# Patient Record
Sex: MALE | Race: BLACK OR AFRICAN AMERICAN | Employment: FULL TIME | URBAN - METROPOLITAN AREA
[De-identification: names, ages, dates, MRNs, and addresses within clinical notes are randomized per-mention and may not be internally consistent; named-entity substitution may affect disease eponyms.]

---

## 2023-03-20 ENCOUNTER — HOSPITAL ENCOUNTER (OUTPATIENT)
Age: 63
Setting detail: OUTPATIENT SURGERY
End: 2023-03-20
Attending: PODIATRIST
Payer: COMMERCIAL

## 2023-03-30 RX ORDER — OMEPRAZOLE 40 MG/1
40 CAPSULE, DELAYED RELEASE ORAL DAILY
COMMUNITY

## 2023-04-05 RX ORDER — SODIUM CHLORIDE, SODIUM LACTATE, POTASSIUM CHLORIDE, CALCIUM CHLORIDE 600; 310; 30; 20 MG/100ML; MG/100ML; MG/100ML; MG/100ML
1000 INJECTION, SOLUTION INTRAVENOUS CONTINUOUS
Start: 2023-04-05

## 2023-04-06 ENCOUNTER — ANESTHESIA EVENT (OUTPATIENT)
Dept: SURGERY | Age: 63
End: 2023-04-06
Payer: COMMERCIAL

## 2023-04-06 ENCOUNTER — ANESTHESIA (OUTPATIENT)
Dept: SURGERY | Age: 63
End: 2023-04-06
Payer: COMMERCIAL

## 2023-04-06 PROCEDURE — 74011250636 HC RX REV CODE- 250/636: Performed by: NURSE ANESTHETIST, CERTIFIED REGISTERED

## 2023-04-06 PROCEDURE — 74011000250 HC RX REV CODE- 250: Performed by: NURSE ANESTHETIST, CERTIFIED REGISTERED

## 2023-04-06 PROCEDURE — 74011250636 HC RX REV CODE- 250/636: Performed by: PODIATRIST

## 2023-04-06 PROCEDURE — 74011000250 HC RX REV CODE- 250: Performed by: PODIATRIST

## 2023-04-06 DEVICE — SCREW BNE L18MM DIA2.5MM MIC FT ANK TI SELF DRL ST CANN: Type: IMPLANTABLE DEVICE | Site: FOOT | Status: FUNCTIONAL

## 2023-04-06 DEVICE — SCREW BNE L13MM DIA2MM CORT FT ANK TI SELF DRL ST SLD FULL: Type: IMPLANTABLE DEVICE | Site: FOOT | Status: FUNCTIONAL

## 2023-04-06 RX ORDER — MIDAZOLAM HYDROCHLORIDE 1 MG/ML
INJECTION, SOLUTION INTRAMUSCULAR; INTRAVENOUS AS NEEDED
Status: DISCONTINUED | OUTPATIENT
Start: 2023-04-06 | End: 2023-04-06 | Stop reason: HOSPADM

## 2023-04-06 RX ORDER — SODIUM CHLORIDE, SODIUM LACTATE, POTASSIUM CHLORIDE, CALCIUM CHLORIDE 600; 310; 30; 20 MG/100ML; MG/100ML; MG/100ML; MG/100ML
INJECTION, SOLUTION INTRAVENOUS
Status: DISCONTINUED | OUTPATIENT
Start: 2023-04-06 | End: 2023-04-06 | Stop reason: HOSPADM

## 2023-04-06 RX ORDER — FENTANYL CITRATE 50 UG/ML
INJECTION, SOLUTION INTRAMUSCULAR; INTRAVENOUS AS NEEDED
Status: DISCONTINUED | OUTPATIENT
Start: 2023-04-06 | End: 2023-04-06 | Stop reason: HOSPADM

## 2023-04-06 RX ORDER — LIDOCAINE HYDROCHLORIDE 10 MG/ML
INJECTION, SOLUTION EPIDURAL; INFILTRATION; INTRACAUDAL; PERINEURAL AS NEEDED
Status: DISCONTINUED | OUTPATIENT
Start: 2023-04-06 | End: 2023-04-06 | Stop reason: HOSPADM

## 2023-04-06 RX ORDER — PROPOFOL 10 MG/ML
INJECTION, EMULSION INTRAVENOUS
Status: DISCONTINUED | OUTPATIENT
Start: 2023-04-06 | End: 2023-04-06 | Stop reason: HOSPADM

## 2023-04-06 RX ORDER — PROPOFOL 10 MG/ML
INJECTION, EMULSION INTRAVENOUS AS NEEDED
Status: DISCONTINUED | OUTPATIENT
Start: 2023-04-06 | End: 2023-04-06 | Stop reason: HOSPADM

## 2023-04-06 RX ADMIN — SODIUM CHLORIDE, POTASSIUM CHLORIDE, SODIUM LACTATE AND CALCIUM CHLORIDE 1000 ML: 600; 310; 30; 20 INJECTION, SOLUTION INTRAVENOUS at 07:19

## 2023-04-06 RX ADMIN — SODIUM CHLORIDE, POTASSIUM CHLORIDE, SODIUM LACTATE AND CALCIUM CHLORIDE: 600; 310; 30; 20 INJECTION, SOLUTION INTRAVENOUS at 07:57

## 2023-04-06 RX ADMIN — PROPOFOL 50 MCG/KG/MIN: 10 INJECTION, EMULSION INTRAVENOUS at 08:07

## 2023-04-06 RX ADMIN — FENTANYL CITRATE 25 MCG: 0.05 INJECTION, SOLUTION INTRAMUSCULAR; INTRAVENOUS at 07:57

## 2023-04-06 RX ADMIN — FENTANYL CITRATE 25 MCG: 0.05 INJECTION, SOLUTION INTRAMUSCULAR; INTRAVENOUS at 08:05

## 2023-04-06 RX ADMIN — MIDAZOLAM HYDROCHLORIDE 2 MG: 2 INJECTION, SOLUTION INTRAMUSCULAR; INTRAVENOUS at 07:57

## 2023-04-06 RX ADMIN — PROPOFOL 50 MG: 10 INJECTION, EMULSION INTRAVENOUS at 08:06

## 2023-04-06 RX ADMIN — EPHEDRINE SULFATE 5 MG: 50 INJECTION INTRAVENOUS at 08:45

## 2023-04-06 RX ADMIN — EPHEDRINE SULFATE 5 MG: 50 INJECTION INTRAVENOUS at 08:59

## 2023-04-06 RX ADMIN — LIDOCAINE HYDROCHLORIDE 100 MG: 10 INJECTION, SOLUTION EPIDURAL; INFILTRATION; INTRACAUDAL; PERINEURAL at 08:05

## 2023-04-06 NOTE — H&P
Patient: Tim Yun MRN: [id number]  SSN: [id number]    YOB: 1960  Age: 70 y.o. Sex: male      History and Physical    Manuel Le Reason is a 70 y.o. male having Procedure(s):  CHEVRON/POSSIBLE AKIN OSTEOTOMY OF THE RIGHT FIRST METATARSAL PHALANGEAL JOINT, WEIL OSTEOTOMY OF THE RIGHT THIRD METATARSAL, ARTHROPLASTY OF THE RIGHT SECOND PROXIMAL INTERPHALANGEAL JOINT (MAC/LOCAL). Allergies: No Known Allergies     Chief Complaint: HERE FOR CHEVRON/POSSIBLE AKIN OSTEOTOMY OF THE RIGHT FIRST METATARSAL PHALANGEAL JOINT, WEIL OSTEOTOMY OF THE RIGHT THIRD METATARSAL, ARTHROPLASTY OF THE RIGHT SECOND PROXIMAL INTERPHALANGEAL JOINT. History of Present Illness: RIGHT FOOT PAIN. Past Medical History:   Diagnosis Date    Cancer [facility]) 2000    prostate cancer    Cancer ([facility] [address] 2000    renal cancer      Past Surgical History:   Procedure Laterality Date    HX OTHER SURGICAL      lasar surgery to remove renal CA    HX PROSTATECTOMY        Family History   Problem Relation Age of Onset    Diabetes Mother     Diabetes Sister     Cancer Sister         breast    Cancer Brother         prostate      Social History     Tobacco Use    Smoking status: Former     Packs/day: 0.25     Years: 12.00     Pack years: 3.00     Types: Cigarettes    Smokeless tobacco: Never   Substance Use Topics    Alcohol use: No        Prior to Admission medications    Medication Sig Start Date End Date Taking? Authorizing Provider   omeprazole (PRILOSEC) 40 mg capsule Take 1 Capsule by mouth daily. Not are of mg   Yes Provider, Historical   docusate sodium (COLACE) 100 mg capsule Take 1 Cap by mouth two (2) times a day. Patient not taking: Reported on 3/30/2023 7/25/12   Laly Warner MD   oxybutynin (DITROPAN) 5 mg tablet Take 1 Tab by mouth every eight (8) hours as needed for Other (Bladder Spasm).   Patient not taking: Reported on 3/30/2023 7/25/12   Laly Warner MD   oxyCODONE (ROXICODONE) 5 mg immediate release tablet Take 1 Tab by mouth every four (4) hours as needed for Pain. Patient not taking: Reported on 3/30/2023 7/25/12   Fredi Horn MD   ciprofloxacin (CIPRO) 500 mg tablet Take 500 mg by mouth two (2) times a day. Patient not taking: Reported on 3/30/2023    Provider, Historical        Visit Vitals  BP (!) 176/99 (BP 1 Location: Left upper arm, BP Patient Position: At rest;Semi fowlers)   Pulse 67   Temp 36.8 °C (98.2 °F)   Resp 18   Ht 6' (1.829 m)   Wt 102.1 kg (225 lb)   SpO2 100%   BMI 30.52 kg/m²        Assessment and Plan:   Frank Morgan is a 70 y.o. male having Procedure(s):  CHEVRON/POSSIBLE AKIN OSTEOTOMY OF THE RIGHT FIRST METATARSAL PHALANGEAL JOINT, WEIL OSTEOTOMY OF THE RIGHT THIRD METATARSAL, ARTHROPLASTY OF THE RIGHT SECOND PROXIMAL INTERPHALANGEAL JOINT (MAC/LOCAL)for RIGHT FOOT PAIN. Preanesthesia Evaluation       Last edited 04/06/23 1400 by Betty Hatfield MD  Date of Service 04/06/23 1033  Status: Addendum               Relevant Problems   PERSONAL HX & FAMILY HX OF CANCER   (+) Prostate cancer [facility])       Anesthetic History   No history of anesthetic complications            Review of Systems / Medical History  Patient summary reviewed, nursing notes reviewed and pertinent labs reviewed    Pulmonary  Within defined limits        Smoker         Neuro/Psych              Cardiovascular  Within defined limits                     GI/Hepatic/Renal  Within defined limits              Endo/Other        Obesity and cancer (CA prostate. )     Other Findings   Comments: RIGHT HALLUX VALGUS.        Procedure Information    Case: [id number] Anesthesia Start Date/Time: 04/06/23 8677  Procedure: CHEVRON/POSSIBLE AKIN OSTEOTOMY OF THE RIGHT FIRST METATARSAL PHALANGEAL JOINT, WEIL OSTEOTOMY OF THE RIGHT THIRD METATARSAL, ARTHROPLASTY OF THE RIGHT SECOND PROXIMAL INTERPHALANGEAL JOINT (MAC/LOCAL) (Right)  Anesthesia type: MAC  Diagnosis:        Acquired hallux valgus of right foot (M20.11) Metatarsalgia of right foot (M77.41)  Pre-op diagnosis:        Acquired hallux valgus of right foot (M20.11)       Metatarsalgia of right foot (M77.41)  Location: [facility] MAIN OR 06 / [facility] MAIN OR  Surgeons: Dilip Mckee MD           Physical Exam    Airway  Mallampati: II  TM Distance: 4 - 6 cm  Neck ROM: normal range of motion   Mouth opening: Normal     Cardiovascular    Rhythm: regular  Rate: normal         Dental    Dentition: Lower dentition intact and Upper dentition intact     Pulmonary  Breath sounds clear to auscultation               Abdominal  GI exam deferred       Other Findings            Anesthetic Plan    ASA: 2  Anesthesia type: MAC    Monitoring Plan: Continuous noninvasive hemodynamic monitoring      Induction: Intravenous  Anesthetic plan and risks discussed with: Patient and Family      LOCAL BY SURGEON.           Preanesthesia evaluation performed by Shaista Winston MD      Revision History         Date/Time User Provider Type Action    > 04/06/23 0833 Shaista Winston MD Physician Addend     04/06/23 7807 Miko Evans MD Physician Addend     04/06/23 0745 Miko Evans MD Physician Sign                      Signed By: Iggy Jennings MD     April 6, 2023

## 2023-04-06 NOTE — ANESTHESIA PREPROCEDURE EVALUATION
Relevant Problems   PERSONAL HX & FAMILY HX OF CANCER   (+) Prostate cancer ([facility] [address]       Anesthetic History   No history of anesthetic complications            Review of Systems / Medical History  Patient summary reviewed, nursing notes reviewed and pertinent labs reviewed    Pulmonary  Within defined limits        Smoker         Neuro/Psych              Cardiovascular  Within defined limits                     GI/Hepatic/Renal  Within defined limits              Endo/Other        Obesity and cancer (CA prostate. )     Other Findings   Comments: RIGHT HALLUX VALGUS. Procedure Information    Case: [id number] Anesthesia Start Date/Time: 04/06/23 0757  Procedure: CHEVRON/POSSIBLE AKIN OSTEOTOMY OF THE RIGHT FIRST METATARSAL PHALANGEAL JOINT, WEIL OSTEOTOMY OF THE RIGHT THIRD METATARSAL, ARTHROPLASTY OF THE RIGHT SECOND PROXIMAL INTERPHALANGEAL JOINT (MAC/LOCAL) (Right)  Anesthesia type: MAC  Diagnosis:        Acquired hallux valgus of right foot (M20.11)       Metatarsalgia of right foot (M77.41)  Pre-op diagnosis:        Acquired hallux valgus of right foot (M20.11)       Metatarsalgia of right foot (M77.41)  Location: [facility] MAIN OR  / [facility] MAIN OR  Surgeons: Ophelia Rudolph MD           Physical Exam    Airway  Mallampati: II  TM Distance: 4 - 6 cm  Neck ROM: normal range of motion   Mouth opening: Normal     Cardiovascular    Rhythm: regular  Rate: normal         Dental    Dentition: Lower dentition intact and Upper dentition intact     Pulmonary  Breath sounds clear to auscultation               Abdominal  GI exam deferred       Other Findings            Anesthetic Plan    ASA: 2  Anesthesia type: MAC    Monitoring Plan: Continuous noninvasive hemodynamic monitoring      Induction: Intravenous  Anesthetic plan and risks discussed with: Patient and Family      LOCAL BY SURGEON.

## 2023-04-06 NOTE — PROGRESS NOTES
DC instructions reviewed with pt, his wife, his daughter all verb understanding. P dc home with family`escorted out via wheelchair.

## 2023-04-06 NOTE — OP NOTES
Operative Note    Patient: Ingrid Hutton  YOB: 1960  MRN: [id number]    Date of Procedure: 4/6/2023     Pre-Op Diagnosis:   1. Acquired hallux valgus of right foot [M20.11]  2. Metatarsalgia of right foot [M77.41]  3. Hammertoe right second proximal interphalangeal joint-M20.41    Post-Op Diagnosis: Same as preoperative diagnosis. Procedure(s):  1. CHEVRON OSTEOTOMY OF THE RIGHT FIRST METATARSAL PHALANGEAL JOINT with two 2.5 mm Arthrex headless compression screws, 18 mm in length respectively-55236  2. WEIL OSTEOTOMY OF THE RIGHT THIRD METATARSAL, one 2.0 mm Arthrex snap off screw 13 mm in length-90052  3. ARTHROPLASTY OF THE RIGHT SECOND PROXIMAL INTERPHALANGEAL JOINT (MAC/LOCAL)-28612    Surgeon(s): Peyton Felix MD    Surgical Assistant: None    Anesthesia: MAC     Estimated Blood Loss (mL):  Minimal    Complications: None    Specimens: * No specimens in log *     Implants:   Implant Name Type Inv. Item Serial No.  Lot No. LRB No. Used Action   SCREW BNE L18MM DIA2. 5MM KOBI FT ANK TI SELF DRL ST YEFRI - SN/A  SCREW BNE L18MM DIA2. 5MM KOBI FT ANK TI SELF DRL ST YEFRI N/A [id number] Right 2 Implanted   SCREW BNE L13MM DIA2MM ZURI FT ANK TI SELF DRL ST SLD FULL - SN/A  SCREW BNE L13MM DIA2MM ZURI FT ANK TI SELF DRL ST SLD FULL N/A [id number] Right 1 Implanted       Drains: * No LDAs found *    Findings: None    Detailed Description of Procedure:   Patient was brought to the OR and placed on the table in a supine position. A well-padded ankle tourniquet was applied to the right ankle but not inflated at this time. Under adequate IV sedation a local block was achieved using 25 cc of 0.5% Marcaine plain. The foot was prepped and draped in the usual sterile manner. With the aid of an Esmarch bandage for exsanguination the tourniquet was then inflated to 250 mmHg.   Attention was then directed to the dorsal medial aspect of the first MPJ where an approximate 5-6 cm curvilinear incision was made. The incision was deepened in the same plane in line with initial skin incision. Attention was directed to the first interspace where the deep transverse intermetatarsal ligament, the conjoined tendon of the adductor tendon, and the fibular sesamoidal ligament was identified and transected. Attention was directed to the dorsal capsule which was incised in line with the incision down to bone. All soft tissues were freed. The medial bump of bone was resected flush with a sagittal saw. A .045'' K wire was driven through the head as an access guide. The knee was bent 90 degrees and using a sagittal saw a long dorsal V osteotomy was fashioned with the apex distal and the wings 55 degrees from one another. The capital fragment was then translocated laterally one third the width of the first metatarsal shaft. The osteotomy was fixated with 2-2.5 mm Arthrex headless compression screws 18 mm in length respectively. Good bone to bone contact was achieved. The hallux sat in a rectus position. Good range of motion was noted on the table. The medial bump of bone was resected flush and all areas were burred smooth. Attention was then directed to the dorsal aspect of the right third metatarsal where a 4 cm linear incision was made. The incision was deepened in same plane. The extensor tendons were retracted laterally and the dorsal aspect of the third metatarsal was identified. Capsule was incised in line with initial skin incision down to bone. All soft tissues were freed. The head of the third metatarsal was identified and using a sagittal saw an oblique osteotomy from dorsal distal to plantar proximal was fashioned. The capital fragment relocated proximally to a good anatomic position was fixated with one 2.0 mm Arthrex headless compression screw, 13 mm in length. Capital fragment was stable in the over hanging bone was rongeured smooth.   Attention was then directed to the dorsal aspect of the right second PIPJ where 2 semielliptical converging incisions centered over the PIPJ were made. The central aspect of skin was excised in toto. The extensor tendon was identified and transected from medial to lateral exposing the head of the proximal phalanx of the second toe. All soft tissues were freed and using a double-action bone cutting forcep the head of the proximal phalanx was excised in toto. The cut was smooth without fragmentation. The wounds were copiously irrigated with sterile saline. Capsule was closed with 2-0 Vicryl suture. Deep and subcutaneous closure was obtained with 3-0 and 4-0 Vicryl suture. The extensor tendon of the second toe was reapproximated with 3-0 Vicryl suture. The skin was closed with 4-0 nylon. An Adaptic dry sterile dressing, Pollo, and an Ace were applied. Patient tolerated the procedure and anesthesia well. Left the operating room to recovery with vital signs stable. Vascular status to the right lower extremity to return to normal preoperative levels. Patient received 2g Ancef IVPB along with postop oral and written instructions along with medications for pain and inflammation. Patient will follow-up in my office in approximately 5-7 days.       Electronically Signed by Krissy Castro MD on 4/6/2023 at 9:41 AM

## 2023-04-06 NOTE — ANESTHESIA POSTPROCEDURE EVALUATION
Procedure(s):  CHEVRON OSTEOTOMY OF THE RIGHT FIRST METATARSAL PHALANGEAL JOINT, WEIL OSTEOTOMY OF THE RIGHT THIRD METATARSAL, ARTHROPLASTY OF THE RIGHT SECOND PROXIMAL INTERPHALANGEAL JOINT (MAC/LOCAL).     MAC, total IV anesthesia    Anesthesia Post Evaluation      Multimodal analgesia: multimodal analgesia not used between 6 hours prior to anesthesia start to PACU discharge  Patient location during evaluation: bedside  Patient participation: complete - patient participated  Level of consciousness: awake  Pain score: 0  Pain management: adequate  Airway patency: patent  Anesthetic complications: no  Cardiovascular status: acceptable  Respiratory status: acceptable  Hydration status: acceptable  Post anesthesia nausea and vomiting:  none  Final Post Anesthesia Temperature Assessment:  Normothermia (36.0-37.5 degrees C)      INITIAL Post-op Vital signs:   Vitals Value Taken Time   /82 04/06/23 0948   Temp 36.6 °C (97.8 °F) 04/06/23 0948   Pulse 65 04/06/23 0948   Resp 14 04/06/23 0948   SpO2 100 % 04/06/23 0948

## 2023-04-06 NOTE — ANESTHESIA PREPROCEDURE EVALUATION
Relevant Problems   PERSONAL HX & FAMILY HX OF CANCER   (+) Prostate cancer ([facility] [address]       Anesthetic History   No history of anesthetic complications            Review of Systems / Medical History  Patient summary reviewed, nursing notes reviewed and pertinent labs reviewed    Pulmonary  Within defined limits        Smoker         Neuro/Psych              Cardiovascular  Within defined limits                     GI/Hepatic/Renal  Within defined limits              Endo/Other        Obesity and cancer (CA prostate. )     Other Findings   Comments: RIGHT HALLUX VALGUS. Procedure Information    Case: [id number] Anesthesia Start Date/Time: 04/06/23 0757  Procedure: CHEVRON/POSSIBLE AKIN OSTEOTOMY OF THE RIGHT FIRST METATARSAL PHALANGEAL JOINT, WEIL OSTEOTOMY OF THE RIGHT THIRD METATARSAL, ARTHROPLASTY OF THE RIGHT SECOND PROXIMAL INTERPHALANGEAL JOINT (MAC/LOCAL) (Right)  Anesthesia type: MAC  Diagnosis:        Acquired hallux valgus of right foot (M20.11)       Metatarsalgia of right foot (M77.41)  Pre-op diagnosis:        Acquired hallux valgus of right foot (M20.11)       Metatarsalgia of right foot (M77.41)  Location: [facility] MAIN OR  / [facility] MAIN OR  Surgeons: Autumn Gonzalez MD           Physical Exam    Airway  Mallampati: II  TM Distance: 4 - 6 cm  Neck ROM: normal range of motion   Mouth opening: Normal     Cardiovascular    Rhythm: regular  Rate: normal         Dental    Dentition: Lower dentition intact and Upper dentition intact     Pulmonary  Breath sounds clear to auscultation               Abdominal  GI exam deferred       Other Findings            Anesthetic Plan    ASA: 2  Anesthesia type: MAC    Monitoring Plan: Continuous noninvasive hemodynamic monitoring      Induction: Intravenous  Anesthetic plan and risks discussed with: Patient and Family      LOCAL BY SURGEON.

## 2025-01-22 ENCOUNTER — TRANSCRIBE ORDERS (OUTPATIENT)
Facility: HOSPITAL | Age: 65
End: 2025-01-22

## 2025-01-22 ENCOUNTER — HOSPITAL ENCOUNTER (OUTPATIENT)
Facility: HOSPITAL | Age: 65
Discharge: HOME | End: 2025-01-25
Payer: COMMERCIAL

## 2025-01-22 DIAGNOSIS — C64.9 MALIGNANT NEOPLASM OF KIDNEY EXCLUDING RENAL PELVIS, UNSPECIFIED LATERALITY (HCC): Primary | ICD-10-CM

## 2025-01-22 DIAGNOSIS — C64.9 RENAL CELL CARCINOMA, UNSPECIFIED LATERALITY (HCC): ICD-10-CM

## 2025-01-22 DIAGNOSIS — C64.9 RENAL CELL CARCINOMA, UNSPECIFIED LATERALITY (HCC): Primary | ICD-10-CM

## 2025-01-22 LAB
BUN SERPL-MCNC: 12 MG/DL (ref 6–20)
CREAT SERPL-MCNC: 1.05 MG/DL (ref 0.7–1.3)

## 2025-01-22 PROCEDURE — 6360000004 HC RX CONTRAST MEDICATION: Performed by: UROLOGY

## 2025-01-22 PROCEDURE — 36415 COLL VENOUS BLD VENIPUNCTURE: CPT

## 2025-01-22 PROCEDURE — 74178 CT ABD&PLV WO CNTR FLWD CNTR: CPT

## 2025-01-22 PROCEDURE — 84520 ASSAY OF UREA NITROGEN: CPT

## 2025-01-22 PROCEDURE — 82565 ASSAY OF CREATININE: CPT

## 2025-01-22 RX ORDER — IOPAMIDOL 755 MG/ML
100 INJECTION, SOLUTION INTRAVASCULAR
Status: COMPLETED | OUTPATIENT
Start: 2025-01-22 | End: 2025-01-22

## 2025-01-22 RX ADMIN — IOPAMIDOL 100 ML: 755 INJECTION, SOLUTION INTRAVENOUS at 17:10

## (undated) DEVICE — SUT VCRL + 2-0 27IN SH UD --

## (undated) DEVICE — SOUTHSIDE TURNOVER: Brand: MEDLINE INDUSTRIES, INC.

## (undated) DEVICE — HYPODERMIC SAFETY NEEDLE: Brand: MONOJECT

## (undated) DEVICE — CUFF TRNQT 18X4 IN 1 FILL LN LL RED STRL EXPRESSAIRE REUSE

## (undated) DEVICE — SPONGE GZ W4XL4IN COT 12 PLY TYP VII WVN C FLD DSGN STERILE

## (undated) DEVICE — 4.0MM EGG BUR

## (undated) DEVICE — SHOE POSTOP M MED LP/LOK CLSR --

## (undated) DEVICE — PRECISION THIN (9.0 X 0.38 X 25.0MM)

## (undated) DEVICE — PENCIL ES CRD L10FT HND SWCHING ROCK SWCH W/ EDGE COAT BLDE

## (undated) DEVICE — GARMENT,MEDLINE,DVT,INT,CALF,MED, GEN2: Brand: MEDLINE

## (undated) DEVICE — STOCKINETTE,DOUBLE PLY,6X48,STERILE: Brand: MEDLINE

## (undated) DEVICE — 3-0 COATED VICRYL PLUS UNDYED 1X27" SH --

## (undated) DEVICE — ELECTRODE PT RET AD L9FT HI MOIST COND ADH HYDRGEL CORDED

## (undated) DEVICE — GUIDEWIRE ORTH DIA0.034IN W/ TRCR TIP LSR MRK DISP FOR MIC

## (undated) DEVICE — MINOR EXTREMITY PACK: Brand: MEDLINE INDUSTRIES, INC.

## (undated) DEVICE — SYR 10ML LUER LOK 1/5ML GRAD --

## (undated) DEVICE — PREP PAD BNS: Brand: CONVERTORS

## (undated) DEVICE — BIT DRL DIA2MM CALIB FOR ANK FRAC MGMT SYS

## (undated) DEVICE — SUTURE NONABSORBABLE MONOFILAMENT 4-0 FS-2 18 IN ETHILON 662H

## (undated) DEVICE — SUTURE VCRL + SZ 4-0 L27IN ABSRB UD PS-2 3/8 CIR REV CUT VCP426H

## (undated) DEVICE — BNDG ELAS HK LOOP 4X5YD NS -- MATRIX

## (undated) DEVICE — DRSG GZ OIL EMUL CURAD 3X3 --

## (undated) DEVICE — BASIC SINGLE BASIN-LF: Brand: MEDLINE INDUSTRIES, INC.

## (undated) DEVICE — T-DRAPE,EXTREMITY,STERILE: Brand: MEDLINE

## (undated) DEVICE — BANDAGE,GAUZE,CONFORMING,3"X75",STRL,LF: Brand: MEDLINE

## (undated) DEVICE — BIT DRL PROF FOR MIC COMPR FULL THRD SCR

## (undated) DEVICE — GLOVE ORANGE PI 8   MSG9080

## (undated) DEVICE — INTENDED FOR TISSUE SEPARATION, AND OTHER PROCEDURES THAT REQUIRE A SHARP SURGICAL BLADE TO PUNCTURE OR CUT.: Brand: BARD-PARKER ® CARBON RIB-BACK BLADES